# Patient Record
Sex: MALE
[De-identification: names, ages, dates, MRNs, and addresses within clinical notes are randomized per-mention and may not be internally consistent; named-entity substitution may affect disease eponyms.]

---

## 2020-03-15 ENCOUNTER — HOSPITAL ENCOUNTER (EMERGENCY)
Dept: HOSPITAL 5 - ED | Age: 30
Discharge: LEFT BEFORE DISCHARGE | End: 2020-03-15
Payer: SELF-PAY

## 2020-03-15 VITALS — SYSTOLIC BLOOD PRESSURE: 160 MMHG | DIASTOLIC BLOOD PRESSURE: 100 MMHG

## 2020-03-15 DIAGNOSIS — M25.571: Primary | ICD-10-CM

## 2020-03-15 PROCEDURE — 99282 EMERGENCY DEPT VISIT SF MDM: CPT

## 2020-03-15 NOTE — EMERGENCY DEPARTMENT REPORT
Chief Complaint: Extremity Injury, Lower


Stated Complaint: POSS BROKEN RT ANKLE


Time Seen by Provider: 03/15/20 19:18





- HPI


History of Present Illness: 





28 y/o male come in for right ankle pain.  States that he had bent down and 

applied all of his weight now has pain.  Has not taken anything for pain. 





- Exam


Vital Signs: 


                                   Vital Signs











  03/15/20





  17:03


 


Temperature 98.8 F


 


Pulse Rate 103 H


 


Respiratory 16





Rate 


 


Blood Pressure 160/100





[Left] 


 


O2 Sat by Pulse 96





Oximetry 











Physical Exam: 





AxO times 3  NAD


right ankle mild swelling no tenderness to palpate no discoloration ambulating w

ithout difficulties. 


MSE screening note: 


Focused history and physical exam performed.


Due to findings the following was ordered:





28 y/o male come in for right ankle pain.  States that he had bent down and 

applied all of his weight now has pain.  Has not taken anything for pain. 





Recommend to take Ibuprofen apply Ice ace bandage and Follow up with ortho. 





ED Disposition for MSE


Disposition: Z-07 MED SCREENING EXAM-LEFT


Is pt being admited?: No


Does the pt Need Aspirin: No


Condition: Stable


Instructions:  Ankle Sprain (ED)


Additional Instructions: 


Recommend to take Ibuprofen apply Ice ace bandage and Follow up with ortho. 


Referrals: 


JOSE FERNANDEZ MD [Staff Physician] - 3-5 Days